# Patient Record
Sex: MALE | Race: WHITE | NOT HISPANIC OR LATINO | Employment: PART TIME | URBAN - METROPOLITAN AREA
[De-identification: names, ages, dates, MRNs, and addresses within clinical notes are randomized per-mention and may not be internally consistent; named-entity substitution may affect disease eponyms.]

---

## 2024-06-10 PROBLEM — N43.3 HYDROCELE: Status: ACTIVE | Noted: 2024-06-10

## 2024-07-21 PROBLEM — Z30.2 ENCOUNTER FOR VASECTOMY: Status: ACTIVE | Noted: 2024-07-21

## 2024-08-14 ENCOUNTER — SOCIAL WORK (OUTPATIENT)
Dept: ADMINISTRATIVE | Facility: OTHER | Age: 54
End: 2024-08-14

## 2024-08-14 NOTE — PROGRESS NOTES
08-14-22 Referral received from Dr.John Dyson, requesting assistance be provided to patient with securing information /assistance regarding changing patients current insurance, leading up to surgery.   will place call to patients sister, as instructed in referral (April W. ) @ [phone].  Call placed to sister, who was encouraged to call Bc Velasquez @ [phone].  Patients sister voiced understanding and agreed to place call.  Anila Snyder-[facility] [place]  -[phone]